# Patient Record
(demographics unavailable — no encounter records)

---

## 2025-01-15 NOTE — PLAN
[FreeTextEntry1] : Constipation - advised diet/ls modifications, will start dulcolax to use prn and refer to GI HTN/HLD/DM/Hypothyroidism- stable, c/w same, will check labs

## 2025-01-15 NOTE — HISTORY OF PRESENT ILLNESS
[FreeTextEntry8] : 74 year old male presents with complaints of constipation.  His last bowel movement was yesterday and lately his BM's have been hard and incomplete.  His son, who is a PA, recommended a fleet enema, which he performed 3 days ago with relief of symptoms.  Currently he denies any nausea, vomiting, diarrhea or abdominal pain.

## 2025-01-15 NOTE — REVIEW OF SYSTEMS
[Constipation] : constipation [Night Sweats] : no night sweats [Discharge] : no discharge [Vision Problems] : no vision problems [Earache] : no earache [Nasal Discharge] : no nasal discharge [Chest Pain] : no chest pain [Orthopena] : no orthopnea [Shortness Of Breath] : no shortness of breath [Abdominal Pain] : no abdominal pain [Nausea] : no nausea [Vomiting] : no vomiting [Heartburn] : no heartburn [Melena] : no melena [Dysuria] : no dysuria [Hematuria] : no hematuria [Joint Pain] : no joint pain [Back Pain] : no back pain [Itching] : no itching [Skin Rash] : no skin rash [Headache] : no headache [Memory Loss] : no memory loss [Suicidal] : not suicidal [Easy Bleeding] : no easy bleeding

## 2025-02-13 NOTE — CONSULT LETTER
[Dear  ___] : Dear  [unfilled], [Consult Letter:] : I had the pleasure of evaluating your patient, [unfilled]. [Please see my note below.] : Please see my note below. [Consult Closing:] : Thank you very much for allowing me to participate in the care of this patient.  If you have any questions, please do not hesitate to contact me. [Sincerely,] : Sincerely, [FreeTextEntry3] : Pavan Denis MD, FACG

## 2025-02-13 NOTE — PHYSICAL EXAM
[Alert] : alert [Normal Voice/Communication] : normal voice/communication [Healthy Appearing] : healthy appearing [No Acute Distress] : no acute distress [Sclera] : the sclera and conjunctiva were normal [Hearing Threshold Finger Rub Not [place]] : hearing was normal [Normal Lips/Gums] : the lips and gums were normal [Oropharynx] : the oropharynx was normal [Normal Appearance] : the appearance of the neck was normal [No Neck Mass] : no neck mass was observed [No Respiratory Distress] : no respiratory distress [No Acc Muscle Use] : no accessory muscle use [Respiration, Rhythm And Depth] : normal respiratory rhythm and effort [Auscultation Breath Sounds / Voice Sounds] : lungs were clear to auscultation bilaterally [Heart Rate And Rhythm] : heart rate was normal and rhythm regular [Normal S1, S2] : normal S1 and S2 [Murmurs] : no murmurs [None] : no edema [Bowel Sounds] : normal bowel sounds [Abdomen Tenderness] : non-tender [No Masses] : no abdominal mass palpated [Abdomen Soft] : soft [] : no hepatosplenomegaly [Cervical Lymph Nodes Enlarged Posterior Bilaterally] : no posterior cervical lymphadenopathy [Supraclavicular Lymph Nodes Enlarged Bilaterally] : no supraclavicular lymphadenopathy [No CVA Tenderness] : no CVA  tenderness [Abnormal Walk] : normal gait [Motor Exam] : the motor exam was normal [Normal] : oriented to person, place, and time [Oriented To Time, Place, And Person] : oriented to person, place, and time [Normal Affect] : the affect was normal [Normal Mood] : the mood was normal

## 2025-02-13 NOTE — HISTORY OF PRESENT ILLNESS
[FreeTextEntry1] : 74-year-old diabetic man complaining of increasing constipation.  He takes Dulcolax as needed.  He denies rectal bleeding, melena or hematemesis.  His most recent colonoscopy on January 27, 2021 was unremarkable.  He has a previous history of colon polyps.

## 2025-02-13 NOTE — ASSESSMENT
[FreeTextEntry1] : Screening colonoscopy in this high risk patient with a personal history of colon polyps and increasing constipation.  Importance of colon cancer screening and the colonoscopy prep was discussed with the patient.  Patient was placed on a daily regimen of Benefiber and Colace 3 times a day. Dietary and lifestyle modification discussed with the patient.  He understands and all questions were answered.

## 2025-03-10 NOTE — PHYSICAL EXAM
[Well Developed] : well developed [Well Nourished] : well nourished [No Acute Distress] : no acute distress [Normal Conjunctiva] : normal conjunctiva [Normal Venous Pressure] : normal venous pressure [No Carotid Bruit] : no carotid bruit [Normal S1, S2] : normal S1, S2 [No Rub] : no rub [No Gallop] : no gallop [Clear Lung Fields] : clear lung fields [Good Air Entry] : good air entry [No Respiratory Distress] : no respiratory distress  [Soft] : abdomen soft [Non Tender] : non-tender [No Masses/organomegaly] : no masses/organomegaly [Normal Bowel Sounds] : normal bowel sounds [Normal Gait] : normal gait [No Edema] : no edema [No Cyanosis] : no cyanosis [No Clubbing] : no clubbing [No Varicosities] : no varicosities [No Rash] : no rash [No Skin Lesions] : no skin lesions [Moves all extremities] : moves all extremities [No Focal Deficits] : no focal deficits [Normal Speech] : normal speech [Alert and Oriented] : alert and oriented [Normal memory] : normal memory [de-identified] : 1-2/6 systolic murmur apex

## 2025-03-10 NOTE — HISTORY OF PRESENT ILLNESS
[FreeTextEntry1] : GIFTY is a 73 year M w/MHx of HLD, HTN, T2DM, Hypothyroid who presents for cardiology follow up.    Patient has been having constipation, improved. pending colonoscopy on 03/17/2025. Patient feels well today, offers no complaints.  Denies chest pain, palpitations, diaphoresis, vision changes, HA, dizziness, syncope, cough, wheezing, SOB/[place], edema, fever, chills, infection.

## 2025-04-22 NOTE — PHYSICAL EXAM
[Alert] : alert [Normal Voice/Communication] : normal voice/communication [Healthy Appearing] : healthy appearing [No Acute Distress] : no acute distress [Sclera] : the sclera and conjunctiva were normal [Hearing Threshold Finger Rub Not [place]] : hearing was normal [Normal Lips/Gums] : the lips and gums were normal [Oropharynx] : the oropharynx was normal [Normal Appearance] : the appearance of the neck was normal [No Neck Mass] : no neck mass was observed [No Respiratory Distress] : no respiratory distress [No Acc Muscle Use] : no accessory muscle use [Respiration, Rhythm And Depth] : normal respiratory rhythm and effort [Auscultation Breath Sounds / Voice Sounds] : lungs were clear to auscultation bilaterally [Heart Rate And Rhythm] : heart rate was normal and rhythm regular [Normal S1, S2] : normal S1 and S2 [Murmurs] : no murmurs [None] : no edema [Bowel Sounds] : normal bowel sounds [Abdomen Tenderness] : non-tender [No Masses] : no abdominal mass palpated [Abdomen Soft] : soft [] : no hepatosplenomegaly [Cervical Lymph Nodes Enlarged Posterior Bilaterally] : no posterior cervical lymphadenopathy [Supraclavicular Lymph Nodes Enlarged Bilaterally] : no supraclavicular lymphadenopathy [No CVA Tenderness] : no CVA  tenderness [Abnormal Walk] : normal gait [Motor Exam] : the motor exam was normal [Normal] : oriented to person, place, and time [Oriented To Time, Place, And Person] : oriented to person, place, and time [Normal Affect] : the affect was normal [Normal Mood] : the mood was normal

## 2025-04-22 NOTE — ASSESSMENT
[FreeTextEntry1] : Personal history of colon polyps.  Importance of surveillance colonoscopy was discussed with the patient.  He will return in 5 years for evaluation for a surveillance colonoscopy.  He understands all questions were answered. Chronic constipation. Dietary and lifestyle modification discussed with the patient.  Continue daily stool softener. Today's care provided as part of continued longitudinal relationship for patient's ongoing need for healthcare services.

## 2025-06-13 NOTE — REVIEW OF SYSTEMS
[Joint Pain] : joint pain [Fever] : no fever [Night Sweats] : no night sweats [Discharge] : no discharge [Vision Problems] : no vision problems [Earache] : no earache [Nasal Discharge] : no nasal discharge [Chest Pain] : no chest pain [Orthopena] : no orthopnea [Shortness Of Breath] : no shortness of breath [Abdominal Pain] : no abdominal pain [Vomiting] : no vomiting [Dysuria] : no dysuria [Hematuria] : no hematuria [Back Pain] : no back pain [Itching] : no itching [Skin Rash] : no skin rash [Headache] : no headache [Memory Loss] : no memory loss [Suicidal] : not suicidal [Easy Bleeding] : no easy bleeding

## 2025-06-13 NOTE — HISTORY OF PRESENT ILLNESS
[FreeTextEntry1] : follow-up, left lower extremity pain [de-identified] : 74 year old male here for a f/u visit. Pt reports intermittent left lower extremity pain which has been on/off for the last 1 month.  The pain is worse with ambulation and relieved with rest. He denies any falls, trauma, inciting event or recent travel.  He does not use any pain relievers.

## 2025-06-13 NOTE — PLAN
[FreeTextEntry1] : Left lower extremity pain - vitals/physical exam stable, will refer to vascular surgery for further evaluation for PAD. DM - chronic, previously controlled, will c/w same, check labs, f/u with endocrinology HTN - controlled, chronic, c/w same, check labs HLD - on statin, CAC 64 (10/2024), will check labs Hypothyroidism - chronic, on levothyroxine 25 mg, f/u with endocrinology, check labs H/o constipation - improved, s/p colonoscopy, was evaluated by GI, advised high fiber diet BPH- chronic, on pharmacotherapy, f/u urology, PSA stable

## 2025-06-13 NOTE — HISTORY OF PRESENT ILLNESS
[FreeTextEntry1] : follow-up, left lower extremity pain [de-identified] : 74 year old male here for a f/u visit. Pt reports intermittent left lower extremity pain which has been on/off for the last 1 month.  The pain is worse with ambulation and relieved with rest. He denies any falls, trauma, inciting event or recent travel.  He does not use any pain relievers.